# Patient Record
Sex: MALE | Race: AMERICAN INDIAN OR ALASKA NATIVE
[De-identification: names, ages, dates, MRNs, and addresses within clinical notes are randomized per-mention and may not be internally consistent; named-entity substitution may affect disease eponyms.]

---

## 2017-01-19 ENCOUNTER — HOSPITAL ENCOUNTER (EMERGENCY)
Dept: HOSPITAL 5 - ED | Age: 37
Discharge: HOME | End: 2017-01-19
Payer: SELF-PAY

## 2017-01-19 VITALS — SYSTOLIC BLOOD PRESSURE: 123 MMHG | DIASTOLIC BLOOD PRESSURE: 86 MMHG
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
## 2017-01-19 DIAGNOSIS — Z21: ICD-10-CM
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
## 2017-01-19 DIAGNOSIS — M62.89: ICD-10-CM
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
## 2017-01-19 DIAGNOSIS — M79.644: Primary | ICD-10-CM

## 2017-01-19 PROCEDURE — 99282 EMERGENCY DEPT VISIT SF MDM: CPT

## 2017-01-19 NOTE — EMERGENCY DEPARTMENT REPORT
ED Upper Extremity Inj HPI





- General


Chief Complaint: Extremity Injury, Upper


Stated Complaint: OBJECT IN FINGER AFTER CUT


Time Seen by Provider: 01/19/17 20:39


Source: patient


Mode of arrival: Ambulatory


Limitations: No Limitations





- History of Present Illness


Initial Comments: 





Patient presents with PIP joint on the right [place] digit of hand.  He states 

this happened 2 weeks ago when he was picking up money off of the concrete and 

scraped his knuckle.  He denies redness, warmth, fever, nausea, chills.


MD Complaint: Injury to:: right


-: Sudden


Other Extremity Injury: Fingers: Right


Place: work (he works as a [employer])


Severity scale (0 -10): 2


Worsens With: other (movement of finger)


Associated Symptoms: denies other symptoms





- Related Data


 Home Medications











 Medication  Instructions  Recorded  Confirmed  Last Taken


 


Elvitegr/Cobicist/Emtric/Tenof 1 each PO DAILY 06/04/15 01/19/17 06/04/15 09:35





[Stribild Tablet]    








 Previous Rx's











 Medication  Instructions  Recorded  Last Taken  Type


 


Ibuprofen [Motrin 800 MG tab] 800 mg PO BID PRN #20 tablet 01/19/17 Unknown Rx











 Allergies











Allergy/AdvReac Type Severity Reaction Status Date / Time


 


No Known Allergies Allergy   Verified 01/19/17 15:33














ED Review of Systems


ROS: 


Stated complaint: OBJECT IN FINGER AFTER CUT


Other details as noted in HPI





Constitutional: denies: chills, fever


Respiratory: denies: cough, shortness of breath, wheezing


Cardiovascular: denies: chest pain, palpitations


Musculoskeletal: as per HPI.  denies: back pain, joint swelling, arthralgia


Skin: as per HPI.  denies: rash, lesions


Neurological: denies: headache, weakness, paresthesias





ED Past Medical Hx





- Past Medical History


Previous Medical History?: Yes


Hx HIV: Yes


Additional medical history: recurrent abscesses





- Surgical History


Past Surgical History?: No





- Social History


Smoking Status: Never Smoker


Substance Use Type: None





- Medications


Home Medications: 


 Home Medications











 Medication  Instructions  Recorded  Confirmed  Last Taken  Type


 


Elvitegr/Cobicist/Emtric/Tenof 1 each PO DAILY 06/04/15 01/19/17 06/04/15 09:35 

History





[Stribild Tablet]     


 


Ibuprofen [Motrin 800 MG tab] 800 mg PO BID PRN #20 tablet 01/19/17  Unknown Rx














ED Physical Exam





- General


Limitations: No Limitations


General appearance: alert, in no apparent distress





- Head


Head exam: Present: atraumatic, normocephalic





- Eye


Eye exam: Present: normal appearance





- Respiratory


Respiratory exam: Present: normal lung sounds bilaterally.  Absent: respiratory 

distress





- Cardiovascular


Cardiovascular Exam: Present: regular rate, normal rhythm.  Absent: systolic 

murmur, diastolic murmur, rubs, gallop





- GI/Abdominal


GI/Abdominal exam: Present: soft, normal bowel sounds





- Expanded Upper Extremity Exam


  ** Right


Shoulder Exam: Present: normal inspection, full ROM


Upper Arm exam: Present: normal inspection, full ROM


Elbow exam: Present: normal inspection, full ROM


Forearm Wrist exam: Present: normal inspection, full ROM


Hand Wrist exam: Present: normal inspection, full ROM, other (third digit PIP 

joint with mild nodule and healing wound approximately 1 mm wide, tender to 

palpation)


Hand L/R Back: 


  __________________________














  __________________________





 1 - 1mm healing wound





Neuro motor exam: Present: wrist extension intact, thumb opposition intact, 

thumb IP flexion intact, thumb adduction intact, fingers 2-5 abduction intact


Neurosensory exam: Present: radial nerve intact, ulnar nerve intact


Vascular: Present: normal capillary refill.  Absent: vascular compromise





- Neurological Exam


Neurological exam: Present: alert, oriented X3





- Psychiatric


Psychiatric exam: Present: normal affect, normal mood





- Skin


Skin exam: Present: warm, dry, intact, normal color.  Absent: rash





ED Course





 Vital Signs











  01/19/17 01/19/17





  15:34 20:28


 


Temperature 98.5 F 98.4 F


 


Pulse Rate 81 77


 


Respiratory  20





Rate  


 


Blood Pressure 115/75 


 


Blood Pressure  123/86





[Right]  


 


O2 Sat by Pulse 100 98





Oximetry  














ED Medical Decision Making





- Medical Decision Making





Patient presents with what appears to be possibly some scar tissue at fourth 

digit on right hand at PIP joint after injury.  We will give ibuprofen and 

refer him to an outpatient clinic.





- Differential Diagnosis


scar tissue, cyst, abscess


Critical Care Time: No


Critical care attestation.: 


If time is entered above; I have spent that time in minutes in the direct care 

of this critically ill patient, excluding procedure time.








ED Disposition


Clinical Impression: 


 Joint pain in fingers of right hand, Scar tissue


Disposition: DISCHARGED TO HOME OR SELFCARE


Is pt being admited?: No


Does the pt Need Aspirin: No


Condition: Stable


Instructions:  Musculoskeletal Pain (ED)


Prescriptions: 


Ibuprofen [Motrin 800 MG tab] 800 mg PO BID PRN #20 tablet


 PRN Reason: Pain


Referrals: 


PRIMARY CARE,MD [Primary Care Provider] - 3-5 Days


[facility] [Outside] - 3-5 Days


Forms:  Work/School Release Form(ED)


Time of Disposition: 20:50

## 2017-10-04 ENCOUNTER — HOSPITAL ENCOUNTER (EMERGENCY)
Dept: HOSPITAL 5 - ED | Age: 37
LOS: 1 days | Discharge: HOME | End: 2017-10-05
Payer: SELF-PAY

## 2017-10-04 DIAGNOSIS — F12.10: ICD-10-CM

## 2017-10-04 DIAGNOSIS — F17.210: ICD-10-CM

## 2017-10-04 DIAGNOSIS — M79.644: Primary | ICD-10-CM

## 2017-10-04 PROCEDURE — 99283 EMERGENCY DEPT VISIT LOW MDM: CPT

## 2017-10-05 VITALS — DIASTOLIC BLOOD PRESSURE: 75 MMHG | SYSTOLIC BLOOD PRESSURE: 114 MMHG

## 2017-10-05 NOTE — XRAY REPORT
FINAL REPORT



EXAM:  XR FINGER(S) 2+V RT



HISTORY:  finger pain, swelling RT RING FINGER 



COMPARISON:  None available. 



FINDINGS:  

Three views of the right 4th finger obtained. There

hyperextension of the 4th PIP joint. No acute fracture

dislocation. Soft tissue swelling at the dorsum of the PIP joint.





IMPRESSION:  

No acute fracture. Hyperextension of the 4th PIP joint which may

be positional with mild soft tissue swelling at the dorsum of the

joint space.

## 2017-10-05 NOTE — EMERGENCY DEPARTMENT REPORT
Upper Extremity





- [facility]


Chief Complaint: Extremity Injury, Upper


Stated Complaint: RIGHT FINGER INJURY





ED Review of Systems


ROS: 


Stated complaint: RIGHT FINGER INJURY


Other details as noted in HPI








ED Past Medical Hx





- Past Medical History


Previous Medical History?: Yes


Hx HIV: Yes


Additional medical history: recurrent abscesses





- Surgical History


Past Surgical History?: No





- Social History


Smoking Status: Current Every Day Smoker


Substance Use Type: Alcohol, Marijuana, Prescribed





- Medications


Home Medications: 


 Home Medications











 Medication  Instructions  Recorded  Confirmed  Last Taken  Type


 


Elvitegr/Cobicist/Emtric/Tenof 1 each PO DAILY 06/04/15 01/19/17 06/04/15 09:35 

History





[Stribild Tablet]     


 


Ibuprofen [Motrin 800 MG tab] 800 mg PO BID PRN #20 tablet 01/19/17  Unknown Rx














Upper Extremity Exam





- Exam


General: 


Vital signs noted. No distress. Alert and acting appropriately.








ED Course


 Vital Signs











  10/04/17





  17:40


 


Temperature 97.9 F


 


Pulse Rate 69


 


Respiratory 18





Rate 


 


Blood Pressure 118/87


 


O2 Sat by Pulse 99





Oximetry 











Critical care attestation.: 


If time is entered above; I have spent that time in minutes in the direct care 

of this critically ill patient, excluding procedure time.








ED Disposition


Condition: Stable


Referrals: 


PRIMARY CARE,MD [Primary Care Provider] - 3-5 Days

## 2018-03-02 ENCOUNTER — HOSPITAL ENCOUNTER (EMERGENCY)
Dept: HOSPITAL 5 - ED | Age: 38
Discharge: HOME | End: 2018-03-02
Payer: SELF-PAY

## 2018-03-02 VITALS — SYSTOLIC BLOOD PRESSURE: 112 MMHG | DIASTOLIC BLOOD PRESSURE: 70 MMHG

## 2018-03-02 DIAGNOSIS — K21.9: Primary | ICD-10-CM

## 2018-03-02 DIAGNOSIS — F12.10: ICD-10-CM

## 2018-03-02 LAB
BASOPHILS # (AUTO): 0 K/MM3 (ref 0–0.1)
BASOPHILS NFR BLD AUTO: 0.4 % (ref 0–1.8)
BILIRUB UR QL STRIP: (no result)
BLOOD UR QL VISUAL: (no result)
BUN SERPL-MCNC: 12 MG/DL (ref 9–20)
BUN/CREAT SERPL: 17 %
CALCIUM SERPL-MCNC: 8.8 MG/DL (ref 8.4–10.2)
EOSINOPHIL # BLD AUTO: 0 K/MM3 (ref 0–0.4)
EOSINOPHIL NFR BLD AUTO: 0.8 % (ref 0–4.3)
HCT VFR BLD CALC: 36 % (ref 35.5–45.6)
HEMOLYSIS INDEX: 6
HGB BLD-MCNC: 11.5 GM/DL (ref 11.8–15.2)
LYMPHOCYTES # BLD AUTO: 0.8 K/MM3 (ref 1.2–5.4)
LYMPHOCYTES NFR BLD AUTO: 18.6 % (ref 13.4–35)
MCH RBC QN AUTO: 28 PG (ref 28–32)
MCHC RBC AUTO-ENTMCNC: 32 % (ref 32–34)
MCV RBC AUTO: 88 FL (ref 84–94)
MONOCYTES # (AUTO): 0.4 K/MM3 (ref 0–0.8)
MONOCYTES % (AUTO): 9.9 % (ref 0–7.3)
MUCOUS THREADS #/AREA URNS HPF: (no result) /HPF
PH UR STRIP: 6 [PH] (ref 5–7)
PLATELET # BLD: 175 K/MM3 (ref 140–440)
PROT UR STRIP-MCNC: (no result) MG/DL
RBC # BLD AUTO: 4.11 M/MM3 (ref 3.65–5.03)
RBC #/AREA URNS HPF: 2 /HPF (ref 0–6)
UROBILINOGEN UR-MCNC: 4 MG/DL (ref ?–2)
WBC #/AREA URNS HPF: 14 /HPF (ref 0–6)

## 2018-03-02 PROCEDURE — 84484 ASSAY OF TROPONIN QUANT: CPT

## 2018-03-02 PROCEDURE — 36415 COLL VENOUS BLD VENIPUNCTURE: CPT

## 2018-03-02 PROCEDURE — 99284 EMERGENCY DEPT VISIT MOD MDM: CPT

## 2018-03-02 PROCEDURE — 93005 ELECTROCARDIOGRAM TRACING: CPT

## 2018-03-02 PROCEDURE — 80048 BASIC METABOLIC PNL TOTAL CA: CPT

## 2018-03-02 PROCEDURE — 93010 ELECTROCARDIOGRAM REPORT: CPT

## 2018-03-02 PROCEDURE — 81001 URINALYSIS AUTO W/SCOPE: CPT

## 2018-03-02 PROCEDURE — 85025 COMPLETE CBC W/AUTO DIFF WBC: CPT

## 2018-03-02 NOTE — EMERGENCY DEPARTMENT REPORT
ED Chest Pain HPI





- General


Chief Complaint: Chest Pain


Stated Complaint: UPPER GASTIC PAIN


Time Seen by Provider: 03/02/18 04:51


Source: patient


Mode of arrival: Ambulatory


Limitations: No Limitations





- History of Present Illness


Initial Comments: 





Patient is a 37-year-old male with no significant past medical history.  

Patient presented to the ER with substernal chest pain, sharp in nature 

increase when he swallow.  Patient stated that he does have a seated sensation 

feeling going up and down in his throat.  Patient denied any nausea or 

vomiting.  No shortness of breath, fever or cough.


MD Complaint: chest pain


-: week(s)


Onset: during rest


Pain Location: substernal


Severity scale (0 -10): 4


Quality: sharp


Worsens With: eating





- Related Data


 Home Medications











 Medication  Instructions  Recorded  Confirmed  Last Taken


 


Elviteg/Cob/Emtri/Tenofo Disop 1 each PO DAILY 06/04/15 01/19/17 06/04/15 09:35





[Stribild Tablet]    








 Previous Rx's











 Medication  Instructions  Recorded  Last Taken  Type


 


Ibuprofen [Motrin 800 MG tab] 800 mg PO BID PRN #20 tablet 01/19/17 Unknown Rx


 


methOCARBAMOL [Robaxin TAB] 500 mg PO TID #15 tab 10/05/17 Unknown Rx











 Allergies











Allergy/AdvReac Type Severity Reaction Status Date / Time


 


No Known Allergies Allergy   Verified 01/19/17 15:33














Heart Score





- HEART Score


History: Slightly suspicious


EKG: Non-specific


Age: < 45


Risk factors: No known risk factors


Troponin: < normal limit


HEART Score: 1





- Critical Actions


Critical Actions: 0-3 pts:0.9-1.7%risk of adverse cardiac event.Candidate for 

discharge





ED Review of Systems


ROS: 


Stated complaint: UPPER GASTIC PAIN


Other details as noted in HPI





Comment: All other systems reviewed and negative


Constitutional: denies: chills, fever


Respiratory: denies: cough, orthopnea, shortness of breath, SOB with exertion


Cardiovascular: chest pain.  denies: palpitations, dyspnea on exertion, 

orthopnea, edema, syncope, paroxysmal nocturnal dyspnea


Gastrointestinal: denies: abdominal pain, nausea, vomiting, diarrhea, 

constipation


Musculoskeletal: denies: back pain


Neurological: denies: headache, weakness, numbness, paresthesias





ED Past Medical Hx





- Past Medical History


Previous Medical History?: Yes


Hx HIV: Yes


Additional medical history: recurrent abscesses





- Surgical History


Past Surgical History?: No





- Social History


Smoking Status: Never Smoker


Substance Use Type: Marijuana





- Medications


Home Medications: 


 Home Medications











 Medication  Instructions  Recorded  Confirmed  Last Taken  Type


 


Elviteg/Cob/Emtri/Tenofo Disop 1 each PO DAILY 06/04/15 01/19/17 06/04/15 09:35 

History





[Stribild Tablet]     


 


Ibuprofen [Motrin 800 MG tab] 800 mg PO BID PRN #20 tablet 01/19/17  Unknown Rx


 


methOCARBAMOL [Robaxin TAB] 500 mg PO TID #15 tab 10/05/17  Unknown Rx














ED Physical Exam





- General


Limitations: No Limitations


General appearance: alert, in no apparent distress





- Head


Head exam: Present: atraumatic, normocephalic, normal inspection





- Eye


Eye exam: Present: normal appearance, PERRL





- ENT


ENT exam: Present: normal exam, normal orophraynx, mucous membranes moist





- Neck


Neck exam: Present: normal inspection.  Absent: tenderness, meningismus, full 

ROM, lymphadenopathy





- Respiratory


Respiratory exam: Present: normal lung sounds bilaterally.  Absent: respiratory 

distress, wheezes, rales, rhonchi, stridor, chest wall tenderness, accessory 

muscle use, decreased breath sounds, prolonged expiratory





- Cardiovascular


Cardiovascular Exam: Present: regular rate, normal rhythm, normal heart sounds





- GI/Abdominal


GI/Abdominal exam: Present: soft, normal bowel sounds.  Absent: distended, 

tenderness, guarding, rebound, rigid, organomegaly, mass, bruit, pulsatile mass

, hernia





- Extremities Exam


Extremities exam: Present: normal inspection, full ROM, normal capillary 

refill.  Absent: pedal edema, calf tenderness





- Back Exam


Back exam: Present: normal inspection, full ROM.  Absent: tenderness, CVA 

tenderness (R), CVA tenderness (L), muscle spasm, paraspinal tenderness, 

vertebral tenderness, rash noted





- Neurological Exam


Neurological exam: Present: alert, oriented X3, CN II-XII intact, normal gait





- Skin


Skin exam: Present: warm, intact, normal color





ED Course





 Vital Signs











  03/02/18





  03:33


 


Temperature 98.1 F


 


Pulse Rate 78


 


Respiratory 16





Rate 


 


Blood Pressure 119/61


 


O2 Sat by Pulse 98





Oximetry 














ED Medical Decision Making





- Lab Data


Result diagrams: 


 03/02/18 03:46





 03/02/18 03:46


Critical care attestation.: 


If time is entered above; I have spent that time in minutes in the direct care 

of this critically ill patient, excluding procedure time.








ED Disposition


Clinical Impression: 


 Chest pain, GERD (gastroesophageal reflux disease)





Disposition: DC-01 TO HOME OR SELFCARE


Is pt being admited?: No


Condition: Stable


Instructions:  Chest Pain (ED), Gastroesophageal Reflux Disease (ED)


Referrals: 


PRIMARY CARE,MD [Primary Care Provider] - 3-5 Days

## 2018-03-20 ENCOUNTER — HOSPITAL ENCOUNTER (EMERGENCY)
Dept: HOSPITAL 5 - ED | Age: 38
Discharge: HOME | End: 2018-03-20
Payer: SELF-PAY

## 2018-03-20 DIAGNOSIS — J20.9: Primary | ICD-10-CM

## 2018-03-20 DIAGNOSIS — K21.0: ICD-10-CM

## 2018-03-20 DIAGNOSIS — B37.0: ICD-10-CM

## 2018-03-20 LAB
BASOPHILS # (AUTO): 0 K/MM3 (ref 0–0.1)
BASOPHILS NFR BLD AUTO: 0.7 % (ref 0–1.8)
BUN SERPL-MCNC: 8 MG/DL (ref 9–20)
BUN/CREAT SERPL: 13 %
CALCIUM SERPL-MCNC: 8.7 MG/DL (ref 8.4–10.2)
EOSINOPHIL # BLD AUTO: 0.1 K/MM3 (ref 0–0.4)
EOSINOPHIL NFR BLD AUTO: 1.2 % (ref 0–4.3)
HCT VFR BLD CALC: 36.5 % (ref 35.5–45.6)
HEMOLYSIS INDEX: 14
HGB BLD-MCNC: 12 GM/DL (ref 11.8–15.2)
LYMPHOCYTES # BLD AUTO: 0.9 K/MM3 (ref 1.2–5.4)
LYMPHOCYTES NFR BLD AUTO: 18.7 % (ref 13.4–35)
MCH RBC QN AUTO: 29 PG (ref 28–32)
MCHC RBC AUTO-ENTMCNC: 33 % (ref 32–34)
MCV RBC AUTO: 87 FL (ref 84–94)
MONOCYTES # (AUTO): 0.5 K/MM3 (ref 0–0.8)
MONOCYTES % (AUTO): 9.2 % (ref 0–7.3)
PLATELET # BLD: 204 K/MM3 (ref 140–440)
RBC # BLD AUTO: 4.18 M/MM3 (ref 3.65–5.03)

## 2018-03-20 PROCEDURE — 71046 X-RAY EXAM CHEST 2 VIEWS: CPT

## 2018-03-20 PROCEDURE — 82140 ASSAY OF AMMONIA: CPT

## 2018-03-20 PROCEDURE — 83615 LACTATE (LD) (LDH) ENZYME: CPT

## 2018-03-20 PROCEDURE — 87400 INFLUENZA A/B EACH AG IA: CPT

## 2018-03-20 PROCEDURE — 82805 BLOOD GASES W/O2 SATURATION: CPT

## 2018-03-20 PROCEDURE — 85025 COMPLETE CBC W/AUTO DIFF WBC: CPT

## 2018-03-20 PROCEDURE — 80048 BASIC METABOLIC PNL TOTAL CA: CPT

## 2018-03-20 PROCEDURE — 36415 COLL VENOUS BLD VENIPUNCTURE: CPT

## 2018-03-20 PROCEDURE — 87116 MYCOBACTERIA CULTURE: CPT

## 2018-03-20 PROCEDURE — 87430 STREP A AG IA: CPT

## 2018-03-20 NOTE — XRAY REPORT
FINAL REPORT



PROCEDURE:  XR CHEST ROUTINE 2V



TECHNIQUE:  PA and lateral chest radiographs were obtained. CPT

59233



HISTORY:  SOB, cough, HIV positive 



COMPARISON:  No prior studies are available for comparison.



FINDINGS:  

Heart: Normal.



Mediastinum/Vessels: Normal.



Lungs/Pleural space: No infiltrate, effusion, or pneumothorax.



Bony thorax: No acute osseous abnormality.



Other: 



IMPRESSION:  

No pulmonary infiltrates.

## 2018-03-20 NOTE — EMERGENCY DEPARTMENT REPORT
- General


Chief Complaint: Upper Respiratory Infection


Stated Complaint: SHORT OF BREATH


Time Seen by Provider: 03/20/18 21:00


Source: patient


Mode of arrival: Ambulatory


Limitations: No Limitations





- History of Present Illness


Initial Comments: 


37-year-old male past medical history HIV not on anti-retroviral medicines self 

discontinued 3 years ago presents with complaint of oral thrush for nearly one 

month some discomfort while swallowing shortness of breath and nonproductive 

cough for nearly 1 month.  Cough has been somewhat worse the last 2 weeks.  

Patient is awake alert and oriented 3 not in acute distress at this time.  

Describes epigastric esophageal burning sensation worse after coughing and 

sometimes worse after eating.  Denies pleuritic chest pain at rest.  Has had 

nonproductive cough for nearly 3-1/2 weeks.  Some subjective chills.  Denies 

any current nausea or vomiting.  States he has noticed multiple white plaques 

and throat consistent with thrush.  Patient is a smoker


MD Complaint: cough


Onset/Timing: 3


-: week(s)


Severity: moderate


Quality: burning


Consistency: intermittent


Improves With: nothing


Worsens With: nothing


Associated Symptoms: fever, sore throat, cough


Treatments Prior to Arrival: none





- Related Data


 Home Medications











 Medication  Instructions  Recorded  Confirmed  Last Taken


 


Elviteg/Cob/Emtri/Tenofo Disop 1 each PO DAILY 06/04/15 01/19/17 06/04/15 09:35





[Stribild Tablet]    








 Previous Rx's











 Medication  Instructions  Recorded  Last Taken  Type


 


Ibuprofen [Motrin 800 MG tab] 800 mg PO BID PRN #20 tablet 01/19/17 Unknown Rx


 


methOCARBAMOL [Robaxin TAB] 500 mg PO TID #15 tab 10/05/17 Unknown Rx


 


Esomeprazole Magnesium [NexIUM] 40 mg PO QDAY #30 capsule.dr 03/02/18 Unknown Rx


 


Sucralfate 1 gm PO AC #120 tablet 03/02/18 Unknown Rx


 


Albuterol Sulfate [Ventolin Hfa] 1 puff IH Q4H PRN #1 hfa.aer.ad 03/20/18 

Unknown Rx


 


Famotidine [Pepcid] 20 mg PO BID PRN #30 tablet 03/20/18 Unknown Rx


 


Levofloxacin [Levaquin TAB] 500 mg PO QDAY #7 tablet 03/20/18 Unknown Rx


 


Nystatin [Nystatin SUSP] 5 ml PO QID #1 bottle 03/20/18 Unknown Rx


 


Phenylephrine/Dm/Acetaminop/GG 10 ml PO Q6H PRN #1 liquid 03/20/18 Unknown Rx





[Mucinex Cold-Flu-Sorethroat Lq]    











 Allergies











Allergy/AdvReac Type Severity Reaction Status Date / Time


 


No Known Allergies Allergy   Verified 01/19/17 15:33














ED Review of Systems


ROS: 


Stated complaint: SHORT OF BREATH


Other details as noted in HPI





Constitutional: malaise.  denies: chills, fever


Eyes: denies: eye pain, eye discharge, vision change


ENT: throat pain.  denies: ear pain


Respiratory: cough.  denies: shortness of breath, wheezing


Cardiovascular: denies: chest pain, palpitations


Endocrine: no symptoms reported


Gastrointestinal: denies: abdominal pain, nausea, diarrhea


Genitourinary: denies: urgency, dysuria


Musculoskeletal: denies: back pain, joint swelling, arthralgia


Skin: denies: rash, lesions


Neurological: denies: headache, weakness, paresthesias


Psychiatric: denies: anxiety, depression


Hematological/Lymphatic: denies: easy bleeding, easy bruising





ED Past Medical Hx





- Past Medical History


Hx HIV: Yes


Additional medical history: recurrent abscesses





- Social History


Smoking Status: Never Smoker


Substance Use Type: None





- Medications


Home Medications: 


 Home Medications











 Medication  Instructions  Recorded  Confirmed  Last Taken  Type


 


Elviteg/Cob/Emtri/Tenofo Disop 1 each PO DAILY 06/04/15 01/19/17 06/04/15 09:35 

History





[Stribild Tablet]     


 


Ibuprofen [Motrin 800 MG tab] 800 mg PO BID PRN #20 tablet 01/19/17  Unknown Rx


 


methOCARBAMOL [Robaxin TAB] 500 mg PO TID #15 tab 10/05/17  Unknown Rx


 


Esomeprazole Magnesium [NexIUM] 40 mg PO QDAY #30 capsule.dr 03/02/18  Unknown 

Rx


 


Sucralfate 1 gm PO AC #120 tablet 03/02/18  Unknown Rx


 


Albuterol Sulfate [Ventolin Hfa] 1 puff IH Q4H PRN #1 hfa.aer.ad 03/20/18  

Unknown Rx


 


Famotidine [Pepcid] 20 mg PO BID PRN #30 tablet 03/20/18  Unknown Rx


 


Levofloxacin [Levaquin TAB] 500 mg PO QDAY #7 tablet 03/20/18  Unknown Rx


 


Nystatin [Nystatin SUSP] 5 ml PO QID #1 bottle 03/20/18  Unknown Rx


 


Phenylephrine/Dm/Acetaminop/GG 10 ml PO Q6H PRN #1 liquid 03/20/18  Unknown Rx





[Mucinex Cold-Flu-Sorethroat Lq]     














ED Physical Exam





- General


Limitations: No Limitations


General appearance: alert, in no apparent distress





- Head


Head exam: Present: atraumatic, normocephalic





- Eye


Eye exam: Present: normal appearance, PERRL, EOMI





- ENT


ENT exam: Present: mucous membranes moist





- Expanded ENT Exam


  ** Expanded


Throat exam: Positive: tonsillar exudate (white plaques on oropharynx no 

peritonsillar abscess no PTA uvula is midline)





- Neck


Neck exam: Present: normal inspection





- Respiratory


Respiratory exam: Present: normal lung sounds bilaterally (lungs clear to 

auscultation bilaterally).  Absent: respiratory distress





- Cardiovascular


Cardiovascular Exam: Present: regular rate, normal rhythm.  Absent: systolic 

murmur, diastolic murmur, rubs, gallop





- GI/Abdominal


GI/Abdominal exam: Present: soft, normal bowel sounds





- Rectal


Rectal exam: Present: deferred





- Extremities Exam


Extremities exam: Present: normal inspection





- Back Exam


Back exam: Present: normal inspection





- Neurological Exam


Neurological exam: Present: alert, oriented X3





- Psychiatric


Psychiatric exam: Present: normal affect, normal mood





- Skin


Skin exam: Present: warm, dry, intact, normal color.  Absent: rash





ED Course


 Vital Signs











  03/20/18





  18:48


 


Temperature 98.3 F


 


Pulse Rate 95 H


 


Respiratory 18





Rate 


 


Blood Pressure 118/83


 


O2 Sat by Pulse 97





Oximetry 














ED Medical Decision Making





- Lab Data


Result diagrams: 


 03/20/18 21:25





 03/20/18 21:25





- Medical Decision Making


A/P: Acute bronchitis, oral thrush


1- case discussed with Dr. Rivas before discharge.  Vital signs stable before 

discharge. PERC Rule negative. pt does not meets SIRS criteria [email or web address]

[email or web address]


2- oral Magic mouthwash/oral nystatin for thrush


3- empiric course of Levaquin 500 mg daily for 7 days for acute bronchitis in 

immunocompromised pt


4- flu swab negative, strep swab negative, chest x-ray unremarkable.  CBC and 

BMP are unremarkable


5- I referred patient to primary care and [facility] for HIV care https

[email or web address] 


Critical care attestation.: 


If time is entered above; I have spent that time in minutes in the direct care 

of this critically ill patient, excluding procedure time.








ED Disposition


Clinical Impression: 


 Oral candidiasis





Acute bronchitis


Qualifiers:


 Bronchitis organism: unspecified organism Qualified Code(s): J20.9 - Acute 

bronchitis, unspecified





GERD (gastroesophageal reflux disease)


Qualifiers:


 Esophagitis presence: with esophagitis Qualified Code(s): K21.0 - Gastro-

esophageal reflux disease with esophagitis





Disposition: DC-01 TO HOME OR SELFCARE


Is pt being admited?: No


Does the pt Need Aspirin: No


Condition: Stable


Instructions:  Oral Candidiasis (ED), Acute Bronchitis (ED), Human 

Immunodeficiency Virus Infection (ED), Gastroesophageal Reflux Disease (ED)


Additional Instructions: 


[email or web address]


Prescriptions: 


Albuterol Sulfate [Ventolin Hfa] 1 puff IH Q4H PRN #1 hfa.aer.ad


 PRN Reason: Cough


Famotidine [Pepcid] 20 mg PO BID PRN #30 tablet


 PRN Reason: Indigestion


Levofloxacin [Levaquin TAB] 500 mg PO QDAY #7 tablet


Nystatin [Nystatin SUSP] 5 ml PO QID #1 bottle


Phenylephrine/Dm/Acetaminop/GG [Mucinex Cold-Flu-Sorethroat Lq] 10 ml PO Q6H 

PRN #1 liquid


 PRN Reason: Cough


Referrals: 


[facility] [Outside] - 3-5 Days


[facility] [Outside] - 3-5 Days


Forms:  Work/School Release Form(ED)


Time of Disposition: 22:58

## 2018-03-21 VITALS — DIASTOLIC BLOOD PRESSURE: 94 MMHG | SYSTOLIC BLOOD PRESSURE: 138 MMHG

## 2018-08-22 ENCOUNTER — HOSPITAL ENCOUNTER (EMERGENCY)
Dept: HOSPITAL 5 - ED | Age: 38
Discharge: HOME | End: 2018-08-22
Payer: SELF-PAY

## 2018-08-22 VITALS — DIASTOLIC BLOOD PRESSURE: 74 MMHG | SYSTOLIC BLOOD PRESSURE: 121 MMHG

## 2018-08-22 DIAGNOSIS — M54.89: Primary | ICD-10-CM

## 2018-08-22 LAB
BASOPHILS # (AUTO): 0 K/MM3 (ref 0–0.1)
BASOPHILS NFR BLD AUTO: 0.3 % (ref 0–1.8)
BILIRUB UR QL STRIP: (no result)
BLOOD UR QL VISUAL: (no result)
BUN SERPL-MCNC: 9 MG/DL (ref 9–20)
BUN/CREAT SERPL: 9 %
CALCIUM SERPL-MCNC: 9.4 MG/DL (ref 8.4–10.2)
EOSINOPHIL # BLD AUTO: 0.1 K/MM3 (ref 0–0.4)
EOSINOPHIL NFR BLD AUTO: 1.8 % (ref 0–4.3)
HCT VFR BLD CALC: 41.7 % (ref 35.5–45.6)
HEMOLYSIS INDEX: 18
HGB BLD-MCNC: 14.1 GM/DL (ref 11.8–15.2)
LYMPHOCYTES # BLD AUTO: 1.6 K/MM3 (ref 1.2–5.4)
LYMPHOCYTES NFR BLD AUTO: 27.6 % (ref 13.4–35)
MCH RBC QN AUTO: 30 PG (ref 28–32)
MCHC RBC AUTO-ENTMCNC: 34 % (ref 32–34)
MCV RBC AUTO: 88 FL (ref 84–94)
MONOCYTES # (AUTO): 0.6 K/MM3 (ref 0–0.8)
MONOCYTES % (AUTO): 10.5 % (ref 0–7.3)
MUCOUS THREADS #/AREA URNS HPF: (no result) /HPF
PH UR STRIP: 6 [PH] (ref 5–7)
PLATELET # BLD: 255 K/MM3 (ref 140–440)
PROT UR STRIP-MCNC: (no result) MG/DL
RBC # BLD AUTO: 4.73 M/MM3 (ref 3.65–5.03)
RBC #/AREA URNS HPF: 3 /HPF (ref 0–6)
UROBILINOGEN UR-MCNC: 2 MG/DL (ref ?–2)
WBC #/AREA URNS HPF: < 1 /HPF (ref 0–6)

## 2018-08-22 PROCEDURE — 85025 COMPLETE CBC W/AUTO DIFF WBC: CPT

## 2018-08-22 PROCEDURE — 80048 BASIC METABOLIC PNL TOTAL CA: CPT

## 2018-08-22 PROCEDURE — 85379 FIBRIN DEGRADATION QUANT: CPT

## 2018-08-22 PROCEDURE — 81001 URINALYSIS AUTO W/SCOPE: CPT

## 2018-08-22 PROCEDURE — 36415 COLL VENOUS BLD VENIPUNCTURE: CPT

## 2018-08-22 PROCEDURE — 99284 EMERGENCY DEPT VISIT MOD MDM: CPT

## 2018-08-22 PROCEDURE — 96374 THER/PROPH/DIAG INJ IV PUSH: CPT

## 2018-08-22 PROCEDURE — 74176 CT ABD & PELVIS W/O CONTRAST: CPT

## 2018-08-22 NOTE — EMERGENCY DEPARTMENT REPORT
HPI





- General


Chief Complaint: Abdominal Pain


Time Seen by Provider: 18 04:29





- HPI


HPI: 





Room 7





The patient is a 37-year-old male presenting with a chief complaint of left 

flank pain.  The patient states for the past 5 days she has had constant pain 

in his left flank described as a tightness pressure and throbbing.  Patient 

denies any preceding injury.  Patient denies dysuria, hematuria or shortness of 

breath.  Patient denies fever.  Patient states the pain increases with 

movement.  The patient gives his pain a score of 10/10





Location: Left flank


Duration: 5 days


Quality: Pressure/throbbing


Severity: 10/10


Modifying factors: [see above]


Context: [see above]


Mode of transportation: The patient drove himself to the emergency department 

and there are no visitors present





ED Past Medical Hx





- Past Medical History


Previous Medical History?: Yes


Hx HIV: Yes


Additional medical history: recurrent abscesses





- Surgical History


Past Surgical History?: No





- Family History


Family history: no significant





- Social History


Smoking Status: Never Smoker


Substance Use Type: None (denies illicit drug use), Alcohol (rarely)





- Medications


Home Medications: 


 Home Medications











 Medication  Instructions  Recorded  Confirmed  Last Taken  Type


 


Elviteg/Cob/Emtri/Tenofo Disop 1 each PO DAILY 06/04/15 01/19/17 06/04/15 09:35 

History





[Stribild Tablet]     


 


Ibuprofen [Motrin 800 MG tab] 800 mg PO BID PRN #20 tablet 17  Unknown Rx


 


methOCARBAMOL [Robaxin TAB] 500 mg PO TID #15 tab 10/05/17  Unknown Rx


 


Esomeprazole Magnesium [NexIUM] 40 mg PO QDAY #30 capsule.dr 18  Unknown 

Rx


 


Sucralfate 1 gm PO AC #120 tablet 18  Unknown Rx


 


Albuterol Sulfate [Ventolin Hfa] 1 puff IH Q4H PRN #1 hfa.aer.ad 18  

Unknown Rx


 


Famotidine [Pepcid] 20 mg PO BID PRN #30 tablet 18  Unknown Rx


 


Nystatin [Nystatin SUSP] 5 ml PO QID #1 bottle 18  Unknown Rx


 


Phenylephrine/Dm/Acetaminop/GG 10 ml PO Q6H PRN #1 liquid 18  Unknown Rx





[Mucinex Cold-Flu-Sorethroat Lq]     


 


levoFLOXacin [Levaquin TAB] 500 mg PO QDAY #7 tablet 18  Unknown Rx


 


Cyclobenzaprine [Flexeril] 10 mg PO TID PRN #14 tablet 18  Unknown Rx


 


HYDROcodone/APAP 5-325 [[place] 1 - 2 each PO Q6HR PRN #14 tablet 18  

Unknown Rx





5/325]     


 


Ibuprofen [Motrin 800 MG tab] 800 mg PO Q8HR PRN #20 tablet 18  Unknown Rx














ED Review of Systems


ROS: 


Stated complaint: BACK PAIN


Other details as noted in HPI





Constitutional: denies: fever


Eyes: denies: eye pain


ENT: denies: throat pain


Respiratory: denies: shortness of breath


Cardiovascular: denies: chest pain


Endocrine: denies: unexplained weight loss


Gastrointestinal: denies: abdominal pain


Genitourinary: denies: dysuria, hematuria


Musculoskeletal: back pain


Skin: denies: change in color


Neurological: denies: headache





Physical Exam





- Physical Exam


Vital Signs: 


 Vital Signs











  18





  03:14 03:35 04:00


 


Temperature 98.2 F 98.1 F 


 


Pulse Rate 77 70 


 


Respiratory 20 18 





Rate   


 


Blood Pressure 139/89  121/74


 


Blood Pressure  129/88 





[Left]   


 


O2 Sat by Pulse 100 100 98





Oximetry   











Physical Exam: 





GENERAL: The patient is well-developed well-nourished male lying on stretcher 

not appearing to be in acute distress. []


HEENT: Normocephalic.  Atraumatic.  Extraocular motions are intact.  Patient 

has moist mucous membranes.


NECK: Supple.  Trachea midline


CHEST/LUNGS: Clear to auscultation.  There is no respiratory distress noted.


HEART/CARDIOVASCULAR: Regular.  There is no tachycardia.  There is no gallop 

rub or murmur.


ABDOMEN: Abdomen is soft, with mild pressure in suprapubic region.  Patient has 

normal bowel sounds.  There is no abdominal distention.


SKIN: There is no rash.  There is no edema.  There is no diaphoresis.


NEURO: The patient is awake, alert, and oriented.  The patient is cooperative. 

The patient has normal speech


MUSCULOSKELETAL: There is no evidence of acute injury.





ED Course


 Vital Signs











  18





  03:14 03:35 04:00


 


Temperature 98.2 F 98.1 F 


 


Pulse Rate 77 70 


 


Respiratory 20 18 





Rate   


 


Blood Pressure 139/89  121/74


 


Blood Pressure  129/88 





[Left]   


 


O2 Sat by Pulse 100 100 98





Oximetry   














ED Medical Decision Making





- Lab Data


Result diagrams: 


 18 04:57





 18 06:30





 Laboratory Tests











  18





  04:50 04:57 06:30


 


WBC   5.8 


 


RBC   4.73 


 


Hgb   14.1 


 


Hct   41.7 


 


MCV   88 


 


MCH   30 


 


MCHC   34 


 


RDW   14.0 


 


Plt Count   255 


 


Lymph % (Auto)   27.6 


 


Mono % (Auto)   10.5 H 


 


Eos % (Auto)   1.8 


 


Baso % (Auto)   0.3 


 


Lymph #   1.6 


 


Mono #   0.6 


 


Eos #   0.1 


 


Baso #   0.0 


 


Seg Neutrophils %   59.8 


 


Seg Neutrophils #   3.5 


 


D-Dimer   


 


Sodium    135 L


 


Potassium    3.5 L


 


Chloride    97.9 L


 


Carbon Dioxide    24


 


Anion Gap    17


 


BUN    9


 


Creatinine    1.0


 


Estimated GFR    > 60


 


BUN/Creatinine Ratio    9


 


Glucose    92


 


Calcium    9.4


 


Urine Color  Yellow  


 


Urine Turbidity  Clear  


 


Urine pH  6.0  


 


Ur Specific Gravity  1.020  


 


Urine Protein  <15 mg/dl  


 


Urine Glucose (UA)  Neg  


 


Urine Ketones  Neg  


 


Urine Blood  Neg  


 


Urine Nitrite  Neg  


 


Urine Bilirubin  Neg  


 


Urine Urobilinogen  2.0  


 


Ur Leukocyte Esterase  Neg  


 


Urine WBC (Auto)  < 1.0  


 


Urine RBC (Auto)  3.0  


 


U Epithel Cells (Auto)  < 1.0  


 


Urine Mucus  Few  














  18





  06:30


 


WBC 


 


RBC 


 


Hgb 


 


Hct 


 


MCV 


 


MCH 


 


MCHC 


 


RDW 


 


Plt Count 


 


Lymph % (Auto) 


 


Mono % (Auto) 


 


Eos % (Auto) 


 


Baso % (Auto) 


 


Lymph # 


 


Mono # 


 


Eos # 


 


Baso # 


 


Seg Neutrophils % 


 


Seg Neutrophils # 


 


D-Dimer  < 135.00


 


Sodium 


 


Potassium 


 


Chloride 


 


Carbon Dioxide 


 


Anion Gap 


 


BUN 


 


Creatinine 


 


Estimated GFR 


 


BUN/Creatinine Ratio 


 


Glucose 


 


Calcium 


 


Urine Color 


 


Urine Turbidity 


 


Urine pH 


 


Ur Specific Gravity 


 


Urine Protein 


 


Urine Glucose (UA) 


 


Urine Ketones 


 


Urine Blood 


 


Urine Nitrite 


 


Urine Bilirubin 


 


Urine Urobilinogen 


 


Ur Leukocyte Esterase 


 


Urine WBC (Auto) 


 


Urine RBC (Auto) 


 


U Epithel Cells (Auto) 


 


Urine Mucus 














- Radiology Data


Radiology results: report reviewed (CT abdomen and pelvis), image reviewed (CT 

abdomen and pelvis)





Findings


[facility] [address] [place] [address] 

Cat Scan Report Signed Patient: JOSE QUEEN MR#: [id number] : [birth year]

1980 Acct:[id number] Age/Sex: 37 / M ADM Date: 18 Loc: ED Attending Dr: 

Ordering Physician: ROMULO CARTER MD Date of Service: 18 Procedure(s)

: CT abdomen pelvis wo con Accession Number(s): [id number] cc: ROMULO CARTER MD FINAL REPORT PROCEDURE: CT ABDOMEN PELVIS WO CON TECHNIQUE: Computerized 

axial tomography of the abdomen and pelvis was performed without intravenous 

contrast. This study is performed without intravascular contrast material and 

its sensitivity for abdominal and pelvic pathology, including neoplasms, 

inflammation, abscess, free fluid, thrombosis, arterial dissection and 

infarction, is reduced compared with a contrast enhanced study. HISTORY: left 

flank pain COMPARISON: No prior studies are available for comparison. FINDINGS: 

Visualized lower thorax: No significant abnormality. Liver: Normal size and 

attenuation. Spleen: Normal size and attenuation. Gallbladder and biliary system

: Normal. Pancreas: Normal. Adrenals: Normal. Kidneys: There are no kidney 

stones. There is no hydronephrosis.. GI tract: There is no bowel obstruction, 

colitis or enteritis. The appendix is normal.. Lymph nodes and mesentery: 

Normal. Vasculature: Normal. Bladder: Normal. Reproductive organs: Normal. 

Peritoneum: There is no ascites, free air, abscess or adenopathy.. 

Musculoskeletal structures: No significant abnormality. Other: None. IMPRESSION

: There are no kidney stones. There is no hydronephrosis.. There is no bowel 

obstruction, colitis or enteritis. The appendix is normal.. There is no ascites

, free air, abscess or adenopathy.. . Transcribed By: CO Dictated By: BRINA BELL MD Electronically Authenticated By: BRINA BELL MD 

Signed Date/Time: 18 DD/DT: 18 TD/TT: 18 





- Differential Diagnosis


renal colic, PE, lumbar radiculopathy


Critical care attestation.: 


If time is entered above; I have spent that time in minutes in the direct care 

of this critically ill patient, excluding procedure time.








ED Disposition


Clinical Impression: 


 Back pain





Disposition: DC-01 TO HOME OR SELFCARE


Is pt being admited?: No


Does the pt Need Aspirin: No


Condition: Stable


Instructions:  Acute Low Back Pain (ED), Back Pain (ED)


Additional Instructions: 


Return to the emergency department immediately should you develop worsening 

symptoms, fever, inability to tolerate food or liquid or any other concerns.


Prescriptions: 


Cyclobenzaprine [Flexeril] 10 mg PO TID PRN #14 tablet


 PRN Reason: Muscle Spasm


HYDROcodone/APAP 5-325 [[place] 5/325] 1 - 2 each PO Q6HR PRN #14 tablet


 PRN Reason: Pain


Ibuprofen [Motrin 800 MG tab] 800 mg PO Q8HR PRN #20 tablet


 PRN Reason: Pain, Moderate (4-6)


Referrals: 


PRIMARY CARE,MD [Primary Care Provider] - 3-5 Days


YUSUF CARLSON MD [Staff Physician] - 3-5 Days (Dr. Carlsno is an 

orthopedic surgeon.  Please follow up with him for further evaluation)


Time of Disposition: 08:15

## 2018-08-22 NOTE — EMERGENCY DEPARTMENT REPORT
Blank Doc





- Documentation


Documentation: 





Age is 37 year  male who is complaining of left flank pain for the past 

5 days.  Patient states that pain is worse when he moves there is no pain on 

palpation.  Patient denies any urinary symptoms.  Patient was seen at the 

hospital had x-ray of his back but was told was normal and discharged home.  

Pain is 8 out of 10 in severity.  Patient will have a CT of the abdomen and 

pelvis to rule out obstructive uropathy urinalysis and lab work is been ordered 

as well.  Patient will be reassessed.

## 2018-08-22 NOTE — CAT SCAN REPORT
FINAL REPORT



PROCEDURE:  CT ABDOMEN PELVIS WO CON



TECHNIQUE:  Computerized axial tomography of the abdomen and

pelvis was performed without intravenous contrast. This study is

performed without intravascular contrast material and its

sensitivity for abdominal and pelvic pathology, including

neoplasms, inflammation, abscess, free fluid, thrombosis,

arterial dissection and infarction, is reduced compared with a

contrast enhanced study. 



HISTORY:  left flank pain 



COMPARISON:  No prior studies are available for comparison.



FINDINGS:  

Visualized lower thorax: No significant abnormality.



Liver: Normal size and attenuation.



Spleen: Normal size and attenuation.



Gallbladder and biliary system: Normal.



Pancreas: Normal.



Adrenals: Normal.



Kidneys: There are no kidney stones. There is no hydronephrosis..



GI tract: There is no bowel obstruction, colitis or enteritis.

The appendix is normal..



Lymph nodes and mesentery: Normal.



Vasculature: Normal.



Bladder: Normal.



Reproductive organs: Normal.



Peritoneum: There is no ascites, free air, abscess or

adenopathy..



Musculoskeletal structures: No significant abnormality.



Other: None.



IMPRESSION:  





There are no kidney stones. There is no hydronephrosis..



There is no bowel obstruction, colitis or enteritis. The appendix

is normal..



There is no ascites, free air, abscess or adenopathy..



.